# Patient Record
Sex: FEMALE | Race: WHITE | NOT HISPANIC OR LATINO
[De-identification: names, ages, dates, MRNs, and addresses within clinical notes are randomized per-mention and may not be internally consistent; named-entity substitution may affect disease eponyms.]

---

## 2019-08-08 ENCOUNTER — TRANSCRIPTION ENCOUNTER (OUTPATIENT)
Age: 34
End: 2019-08-08

## 2019-08-17 ENCOUNTER — TRANSCRIPTION ENCOUNTER (OUTPATIENT)
Age: 34
End: 2019-08-17

## 2020-04-26 ENCOUNTER — MESSAGE (OUTPATIENT)
Age: 35
End: 2020-04-26

## 2020-05-01 ENCOUNTER — APPOINTMENT (OUTPATIENT)
Dept: DISASTER EMERGENCY | Facility: HOSPITAL | Age: 35
End: 2020-05-01

## 2020-05-04 LAB
SARS-COV-2 IGG SERPL IA-ACNC: 0.2 RATIO
SARS-COV-2 IGG SERPL QL IA: NEGATIVE

## 2020-05-30 ENCOUNTER — TRANSCRIPTION ENCOUNTER (OUTPATIENT)
Age: 35
End: 2020-05-30

## 2020-06-01 PROBLEM — Z00.00 ENCOUNTER FOR PREVENTIVE HEALTH EXAMINATION: Status: ACTIVE | Noted: 2020-06-01

## 2020-06-03 ENCOUNTER — APPOINTMENT (OUTPATIENT)
Dept: ORTHOPEDIC SURGERY | Facility: CLINIC | Age: 35
End: 2020-06-03
Payer: COMMERCIAL

## 2020-06-03 VITALS
TEMPERATURE: 98.8 F | HEART RATE: 71 BPM | DIASTOLIC BLOOD PRESSURE: 68 MMHG | WEIGHT: 155 LBS | SYSTOLIC BLOOD PRESSURE: 104 MMHG | HEIGHT: 61 IN | BODY MASS INDEX: 29.27 KG/M2

## 2020-06-03 DIAGNOSIS — L03.019 CELLULITIS OF UNSPECIFIED FINGER: ICD-10-CM

## 2020-06-03 DIAGNOSIS — Z78.9 OTHER SPECIFIED HEALTH STATUS: ICD-10-CM

## 2020-06-03 PROCEDURE — 10060 I&D ABSCESS SIMPLE/SINGLE: CPT | Mod: F1

## 2020-06-03 PROCEDURE — 99204 OFFICE O/P NEW MOD 45 MIN: CPT | Mod: 25

## 2020-06-16 ENCOUNTER — APPOINTMENT (OUTPATIENT)
Dept: ORTHOPEDIC SURGERY | Facility: CLINIC | Age: 35
End: 2020-06-16

## 2020-07-15 PROBLEM — Z78.9 NO PERTINENT PAST MEDICAL HISTORY: Status: RESOLVED | Noted: 2020-07-15 | Resolved: 2020-07-15

## 2020-07-15 PROBLEM — L03.019 PARONYCHIA OF FINGER, UNSPECIFIED LATERALITY: Status: ACTIVE | Noted: 2020-07-15

## 2020-07-15 NOTE — ASSESSMENT
[FreeTextEntry1] : 34-year-old female with left index finger paronychia status post I&D in the office today. She will continue daily dressing changes and warm soaks, she will continue antibiotics for one week.Followup as needed.

## 2020-07-15 NOTE — HISTORY OF PRESENT ILLNESS
[FreeTextEntry1] : 34-year-old female presents for evaluation of a left index finger paronychia. Her symptoms began on May 22, 2020 she denies specific trauma. She has throbbing pain surrounded the nailbed it has not improved with warm soaks. She had a drainage attemptedo n 30 May which was unsuccessful.

## 2020-07-15 NOTE — PROCEDURE
[FreeTextEntry1] : Left index finger paronychia I&D:\par Digital block administered using 10 cc 1% plain lidocaine\par Digital tourniquet applied\par Longitudinal Incision made at the nail fold, purulent material expressed and swelling was decompressed successfully.\par Bandage applied to the fingertip and the tourniquet was removed, patient tolerated the procedure well.

## 2020-07-15 NOTE — PHYSICAL EXAM
[Normal Finger/nose] : finger to nose coordination [Normal] : no peripheral adenopathy appreciated [de-identified] : anibalr [de-identified] : left index finger:\par Skin intact positive swelling positive erythema no ecchymosis\par no tenderness at the DIP joint active flexion and extension intact\par No swelling or tenderness along the flexor tendon sheath, range of motion of the digits intact\par Neurovascularly intact distally

## 2021-03-25 ENCOUNTER — APPOINTMENT (OUTPATIENT)
Dept: ORTHOPEDIC SURGERY | Facility: CLINIC | Age: 36
End: 2021-03-25
Payer: COMMERCIAL

## 2021-03-25 VITALS
HEART RATE: 77 BPM | HEIGHT: 61 IN | WEIGHT: 155 LBS | BODY MASS INDEX: 29.27 KG/M2 | DIASTOLIC BLOOD PRESSURE: 81 MMHG | TEMPERATURE: 98.2 F | SYSTOLIC BLOOD PRESSURE: 135 MMHG

## 2021-03-25 DIAGNOSIS — M25.562 PAIN IN LEFT KNEE: ICD-10-CM

## 2021-03-25 DIAGNOSIS — M79.642 PAIN IN LEFT HAND: ICD-10-CM

## 2021-03-25 PROCEDURE — 73562 X-RAY EXAM OF KNEE 3: CPT | Mod: LT

## 2021-03-25 PROCEDURE — 73130 X-RAY EXAM OF HAND: CPT | Mod: LT

## 2021-03-25 PROCEDURE — 99072 ADDL SUPL MATRL&STAF TM PHE: CPT

## 2021-03-25 PROCEDURE — 99214 OFFICE O/P EST MOD 30 MIN: CPT

## 2021-03-25 NOTE — HISTORY OF PRESENT ILLNESS
[de-identified] : Patient presents today for evaluation of left knee pain and left hand pain. Her ports a jumping over a fence of a height of approximately 6 feet about a month ago. She reports she's had pain since that time. The pain initially was in the knee and now she develops pain. She reports the left knee pain is posterior. It does cause her to limp. It's worse when she stands for extended periods of time. Many swelling is reported. No specific buckling as reported. No past knee injuries or surgeries reported. The patient is right-hand dominant. She reports of left hand pain in the area of the thenar eminence. She states is only present when she actively moves her hand. She denies any associated bruising. No motor changes. No sensory changes.\par \par Review of Systems-\par Constitutional: No fever or chills. \par Cardiovascular: No orthopnea or chest pain\par Pulmonary: No shortness of breath. \par GI: No nausea or vomiting or abdominal pain.\par Musculoskeletal: see HPI \par Psychiatric: No anxiety and depression.

## 2021-03-25 NOTE — DISCUSSION/SUMMARY
[de-identified] : All of the x-rays reviewed with the patient. There is concern the patient had sustained a medial meniscal tear upon landing after jumping over the fence. She is referred for an MRI. The patient is encouraged to followup in the office after completion of the MRI to review the findings and discuss future treatment plan. As for the left hand, the patient likely suffered a sprain. She will continue to monitor. No indication for bracing at this time. She will reduce her activities until she follows up the left the MRI. All questions were answered to the patient's satisfaction.

## 2021-03-25 NOTE — PHYSICAL EXAM
[de-identified] : The patient appears well nourished and in no apparent distress. The patient is alert and oriented to person, place, and time. Affect and mood appear normal. The head is normocephalic and atraumatic. Skin shows normal turgor with no evidence of eczema or psoriasis. No respiratory distress noted. Sensation grossly intact. MUSCULOSKELETAL:   SEE BELOW\par \par Left knee exam demonstrates normal alignment. No effusions. No erythema or ecchymosis. There is full range of motion 0-125° of flexion. There is localized discomfort of the medial joint line with palpation. There is no tenderness over the medial lateral collateral ligaments at their insertion origins. No laxity of the knee with stress testing. No patellofemoral crepitus.\par \par Left hand chemistries normal alignment. No signs of acute trauma. Normal range of motion actively. No reproducible tenderness of the thenar eminence. No tenderness of the scaphoid. No wrist tenderness. No crepitus. Normal sensation. [de-identified] : Left knee x-rays reviewed. Normal alignment. Normal joint spacing. No fractures.\par \par Left hand x-rays are reviewed. No dislocations. No scaphoid fracture. No retained foreign body. Yes

## 2021-03-30 PROBLEM — M25.562 LEFT KNEE PAIN: Status: ACTIVE | Noted: 2021-03-25

## 2021-03-30 PROBLEM — M79.642 LEFT HAND PAIN: Status: ACTIVE | Noted: 2021-03-25

## 2021-05-10 ENCOUNTER — APPOINTMENT (OUTPATIENT)
Dept: MRI IMAGING | Facility: CLINIC | Age: 36
End: 2021-05-10
Payer: COMMERCIAL

## 2021-05-10 ENCOUNTER — OUTPATIENT (OUTPATIENT)
Dept: OUTPATIENT SERVICES | Facility: HOSPITAL | Age: 36
LOS: 1 days | End: 2021-05-10
Payer: COMMERCIAL

## 2021-05-10 DIAGNOSIS — M25.562 PAIN IN LEFT KNEE: ICD-10-CM

## 2021-05-10 PROCEDURE — 73721 MRI JNT OF LWR EXTRE W/O DYE: CPT

## 2021-05-10 PROCEDURE — 73721 MRI JNT OF LWR EXTRE W/O DYE: CPT | Mod: 26,LT

## 2021-06-01 ENCOUNTER — APPOINTMENT (OUTPATIENT)
Dept: ORTHOPEDIC SURGERY | Facility: CLINIC | Age: 36
End: 2021-06-01
Payer: COMMERCIAL

## 2021-06-01 VITALS
HEART RATE: 63 BPM | SYSTOLIC BLOOD PRESSURE: 135 MMHG | HEIGHT: 61 IN | DIASTOLIC BLOOD PRESSURE: 81 MMHG | WEIGHT: 165 LBS | BODY MASS INDEX: 31.15 KG/M2

## 2021-06-01 PROCEDURE — 99072 ADDL SUPL MATRL&STAF TM PHE: CPT

## 2021-06-01 PROCEDURE — 99213 OFFICE O/P EST LOW 20 MIN: CPT

## 2021-06-07 ENCOUNTER — APPOINTMENT (OUTPATIENT)
Dept: ORTHOPEDIC SURGERY | Facility: CLINIC | Age: 36
End: 2021-06-07
Payer: COMMERCIAL

## 2021-06-07 VITALS — WEIGHT: 168 LBS | BODY MASS INDEX: 31.72 KG/M2 | HEIGHT: 61 IN

## 2021-06-07 PROCEDURE — 99214 OFFICE O/P EST MOD 30 MIN: CPT

## 2021-06-07 PROCEDURE — 99072 ADDL SUPL MATRL&STAF TM PHE: CPT

## 2021-06-07 NOTE — DISCUSSION/SUMMARY
[de-identified] : The underlying pathophysiology was reviewed in great detail with the patient as well as the various treatment options, including ice, analgesics, NSAIDs, Physical therapy, steroid injections, bracing, surgical intervention \par \par MRI of the left knee was reviewed and discussed in great detail today. MRI shows high-grade tear of the femoral origin of the anterior cruciate ligament. Discussed surgical intervention versus conservative management.  The risks and benefits of an arthroscopic ACL construction were reviewed and discussed in great detail. In addition to conservative management of bracing, physical therapy and activity modifications. Based on patients patients age and current activity level, and activity goals, patient elects to proceed with surgical intervention at this time. \par The patient wishes to proceed with SURGICAL INTERVENTION at this time. The risks and benefits of a Arthroscopic ACL reconstruction with a quadriceps tendon autograft were discussed in great detail today, including but not limited to bleeding, infection, nerve injury, DVT, allergy to the anesthetic or to the implants, re-tear, persistent pain, stiffness, scarring, swelling or deformity.\par \par I am prescribing this mechanical DVT Prophylaxis as an alternative/ addition to pharmacological anticoagulants. I consider this form of mechanical prophylaxis to be an equally effective protocol in the post-operative prevention of DVT and PE without excessive bleeding, other potential risk factors and contraindications. I am prescribing this mechanical prophylaxis as in order to help with localized edema and to improve circulation.  This desired mechanical prophylaxis will also benefit the patient if hypertension is present.\par \par I am prescribing the use of NMES (neuromuscular electrical stimulator, Manaflexx 2) to aid in disuse atrophy. The device is to be used for at-home treatment following surgery to help stimulate the surrounding muscles and prevent atrophy that would otherwise occur due to lack of use.\par  \par Surgical Outcomes System (SOS) was discussed at great length today. Patient was educated on the SOS global registry and the benefits of completing patient-reported outcome surveys, including remote monitoring of pain, function, and well-being following their treatment. Patient was encouraged to complete pre- and post-treatment surveys in a timely manner, allowing you to stay current on treatment outcomes. Surveys can be completed on PCs, smart devices, or tablets via any web browser, or they can be completed in the office. Discussed with patient that the SOS software is used to collect data, monitor patients’ treatment progress, and discuss results during follow-up visits. Patient will complete surveys during the preoperative period through 5 years post operatively. Patient consented and signed waiver.\par \par Activity modifications and restrictions were discussed. I advised avoiding deep bending, squatting and high intensity activity.\par \par All questions were answered, all alternatives discussed and the patient is in complete agreement with that plan. Follow-up appointment as instructed. Any issues and the patient will call or come in sooner.

## 2021-06-07 NOTE — PHYSICAL EXAM
[de-identified] : Right Lower Extremity\par o Knee :\par ¦ Inspection/Palpation : no tenderness to palpation, no swelling, no deformity\par ¦ Range of Motion : 0 - 125 degrees, no crepitus\par ¦ Stability : no valgus or varus instability present on provocative testing, Lachman’s Test (-)\par ¦ Strength : hip flexion 5/5, adduction 5/5, gluteus medius 5/5 \par o Muscle Bulk : normal muscle bulk present\par o Skin : no erythema, no ecchymosis\par o Sensation : sensation to pin intact\par o Vascular Exam : no edema, no cyanosis, dorsalis pedis artery pulse 2+, posterior tibial artery pulse 2+\par \par Left Lower Extremity\par o Knee :\par ¦ Inspection/Palpation : no tenderness to palpation, no swelling, no deformity\par ¦ Range of Motion : 0 -125 degrees, no crepitus\par ¦ Stability : no valgus or varus instability present on provocative testing, Lachman’s Test (0-1+)\par ¦ Strength : hip flexion 5/5, adduction 5/5, gluteus medius 5/5 \par o Muscle Bulk : normal muscle bulk present\par o Skin : no erythema, no ecchymosis\par o Sensation : sensation to pin intact\par o Vascular Exam : no edema, no cyanosis, dorsalis pedis artery pulse 2+, posterior tibial artery pulse 2+  [de-identified] : Patient comes to today's visit with outside imaging already performed. I reviewed the images in detail with the patient and discussed the findings as highlighted below. \par \par o MRI of the left knee performed on 05/10/2021 at [facility]: impression: \par 1. High-grade partial-thickness tear of the femoral origin of the anterior cruciate ligament. The remnant fibers appear edematous and amorphus. A thin strand of anterior cruciate ligament fibers may remain intact.\par 2. Mild sprain of the medial collateral ligament.\par 3. Mild osseous contusion along the posterior aspect of the medial tibial plateau. Additional smaller osseous contusions along the central aspect lateral femoral condyle and the posterior aspect lateral tibial plateau.\par 4. Intrasubstance signal within the body/posterior horn and posterior horn of the medial meniscus which does not definitively contact an articular surface to constitute a tear. This may be related to intrasubstance degeneration or meniscal contusion.\par 5. Mild chondral wear along the medial and lateral patellar facets.\par 6. Moderate joint effusion with synovitis.\par 7. Mild muscle strain involving the soleus muscle.\par \par \par o Xray of the left knee performed at [facility] on 03/25/2021: Impression: \par ¦  no acute fracture or dislocation. There is no medial, no lateral and no patellofemoral degenerative changes seen. There is no significant malalignment. No significant other obvious osseous abnormality, otherwise unremarkable. \par

## 2021-06-07 NOTE — HISTORY OF PRESENT ILLNESS
[de-identified] : MARIBEL MARTINO is a 35 year female presenting to the office complaining of left knee. She presents to the office ambulating independently. Patient reports pain began in March 2021.  Patient notes she jumped off a playground to reach her son who got hurt. She felt a pop and her knee buckled at that time and she had immediate pain. She was seen by DAVE Roberts (PCP), MRI was obtained which showed ACL tear. Patient was previously seen by Dr. Mcleod and is here today for a second opinion. Patient describes the pain as a dull aching, and occasionally sharp pain diffusely located to her knee that is intermittent in nature. Her   symptoms are exacerbated with activity and strenuous use of the knee.   Pain is alleviated with rest.  Patient reports instability, but denies catching or locking of the knee.  She has become more active and walks 4-6 weeks a week and cycles on the Peloton. Patient is taking NSAIDs for pain relief with moderate relief in symptoms.  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 Patient denies any other complaints at this time.

## 2021-06-25 NOTE — HISTORY OF PRESENT ILLNESS
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 She jumped off a play ground to reach her son who got hurt. She felt a pop and her knee buckled at that time and she had immediate pain. She was seen by DAVE Roberts, MRI was obtained which showed ACL tear. Pain is brought on with activity and is not taking pain medication. Applies a brace when needed. Reports instability. She has become more active and walks 4-6 weeks a week and cycles on the Peloton. \par \par The patient's past medical history, past surgical history, medications and allergies were reviewed by me today with the patient and documented accordingly. In addition, the patient's family and social history, which were noncontributory to this visit, were reviewed also.

## 2021-06-25 NOTE — ADDENDUM
[FreeTextEntry1] : This note was written by Lissette Bower on 06/01/2021 acting solely as a scribe for Dr. Alberto Nichole.\par \par All medical record entries made by the Scribe were at my, Dr. Alberto Nichole, direction and personally dictated by me on 06/01/2021. I have personally reviewed the chart and agree that the record accurately reflects my personal performance of the history, physical exam, assessment and plan.

## 2021-06-25 NOTE — PHYSICAL EXAM
[de-identified] : Oriented to time, place, person\par Mood: Normal\par Affect: Normal\par Appearance: Healthy, well appearing, no acute distress.\par Gait: Normal\par Assistive Devices: None\par \par Left Knee Exam:\par \par Skin: Clean, dry, intact\par Inspection: No obvious malalignment, no masses, no swelling, no effusion\par Pulses: 2+ DP/PT pulses \par ROM: 0-130 degrees of flexion. No pain with deep knee flexion/extension.\par Tenderness: No MJLT. No LJLT. No pain over the patella facets. No pain to the quadriceps tendon. No pain to the patella tendon. No posterior knee tenderness.\par Stability: Stable to varus, valgus. IIB Lachman testing. + anterior drawer, negative posterior drawer.\par Strength: 5/5 Q/H/TA/GS/EHL, without atrophy\par Neuro: Intact to light touch throughout, DTRs normal\par Additional Tests: Negative Hammad's test, Negative patellar grind test  [de-identified] : Images were reviewed from [facility] dated 03/25/2021.\par \par 4 views of the left knee were obtained that show no acute fracture or dislocation. There is no medial, no lateral and no patellofemoral degenerative changes seen. There is no significant malalignment. No significant other obvious osseous abnormality, otherwise unremarkable. \par \par MRI of the left knee dated 5/8/2021 shows high-grade tear of the femoral origin of the anterior cruciate ligament. The remnant fibers appear edematous and amorphus. Intrasubstance signal within the body/posterior horn and posterior horn of the medial meniscus.

## 2021-06-25 NOTE — DISCUSSION/SUMMARY
[de-identified] : 34 y/o female with left knee ACL tear\par \par Patient presents today for evaluation of left knee injury.  Reveals to be a pivoting injury with high-grade tear of the anterior cruciate ligament, but clinical examination suggest that she does not have any significant instability in the may be some intact fibers are suggested on MRI.  MRI shows high-grade tear of the femoral origin of the anterior cruciate ligament. We discussed conservative management vs. surgical intervention. We discussed conservative management may be appropriate and to see whether there is any continued progression of scarring that would enable her to return to a low impact lifestyle without significant dysfunction. We also discussed surgical intervention for those patients that require return to pivoting activities or have instability that is significant and limits ability to return to daily activities. Patient is leaning towards surgical intervention as she does not want to be limited by her left knee. \par \par All risks, benefits and alternatives to left anterior cruciate ligament reconstruction with evaluation of the meniscal and chondral surfaces were discussed in great detail with the patient. Risks include but are not limited to pain, bleeding, infection, stiffness/instability, impingement, graft re-rupture, meniscectomy versus repair, medical complications and risks of anesthesia. In addition, a discussion was had with the patient regarding anterior cruciate ligament graft options. This included the role of autograft versus allograft, and the associated donor site morbidity and rerupture rate with autograft versus risk of disease transmission/rerupture with allograft use. The patient expressed understanding and all questions were answered.  \par \par A discussion was also had with the patient regarding additional risk given recent Covid-19 pandemic; including the potential additional risk of anesthesia and any medical comorbidities that the patient has.  It was made clear to the patient that we are taking all precautions regarding Covid-19 with regards to surgical scheduling and perioperative care.  The patient understands that current protocol requires Covid-19 testing no more than 48hrs prior to surgery, and PST's may be performed onsite at the day of surgery. \par \par The patient is electing to proceed, and will have the patient will follow-up if she has elected to proceed.

## 2021-07-08 ENCOUNTER — OUTPATIENT (OUTPATIENT)
Dept: OUTPATIENT SERVICES | Facility: HOSPITAL | Age: 36
LOS: 1 days | End: 2021-07-08
Payer: COMMERCIAL

## 2021-07-08 VITALS
RESPIRATION RATE: 12 BRPM | HEART RATE: 73 BPM | HEIGHT: 60.5 IN | WEIGHT: 167.55 LBS | SYSTOLIC BLOOD PRESSURE: 120 MMHG | DIASTOLIC BLOOD PRESSURE: 81 MMHG | TEMPERATURE: 98 F | OXYGEN SATURATION: 98 %

## 2021-07-08 DIAGNOSIS — Z98.890 OTHER SPECIFIED POSTPROCEDURAL STATES: Chronic | ICD-10-CM

## 2021-07-08 DIAGNOSIS — S83.512A SPRAIN OF ANTERIOR CRUCIATE LIGAMENT OF LEFT KNEE, INITIAL ENCOUNTER: ICD-10-CM

## 2021-07-08 DIAGNOSIS — Z98.891 HISTORY OF UTERINE SCAR FROM PREVIOUS SURGERY: Chronic | ICD-10-CM

## 2021-07-08 DIAGNOSIS — Z01.818 ENCOUNTER FOR OTHER PREPROCEDURAL EXAMINATION: ICD-10-CM

## 2021-07-08 LAB
HCT VFR BLD CALC: 41.3 % — SIGNIFICANT CHANGE UP (ref 34.5–45)
HGB BLD-MCNC: 13.3 G/DL — SIGNIFICANT CHANGE UP (ref 11.5–15.5)
MCHC RBC-ENTMCNC: 29.4 PG — SIGNIFICANT CHANGE UP (ref 27–34)
MCHC RBC-ENTMCNC: 32.2 GM/DL — SIGNIFICANT CHANGE UP (ref 32–36)
MCV RBC AUTO: 91.4 FL — SIGNIFICANT CHANGE UP (ref 80–100)
NRBC # BLD: 0 /100 WBCS — SIGNIFICANT CHANGE UP (ref 0–0)
PLATELET # BLD AUTO: 312 K/UL — SIGNIFICANT CHANGE UP (ref 150–400)
RBC # BLD: 4.52 M/UL — SIGNIFICANT CHANGE UP (ref 3.8–5.2)
RBC # FLD: 13.3 % — SIGNIFICANT CHANGE UP (ref 10.3–14.5)
WBC # BLD: 10.33 K/UL — SIGNIFICANT CHANGE UP (ref 3.8–10.5)
WBC # FLD AUTO: 10.33 K/UL — SIGNIFICANT CHANGE UP (ref 3.8–10.5)

## 2021-07-08 PROCEDURE — 85027 COMPLETE CBC AUTOMATED: CPT

## 2021-07-08 PROCEDURE — G0463: CPT

## 2021-07-08 PROCEDURE — 36415 COLL VENOUS BLD VENIPUNCTURE: CPT

## 2021-07-08 NOTE — H&P PST ADULT - HISTORY OF PRESENT ILLNESS
36 yo female presents s/p ACL tear on the left after jumping from the second platform of a playground March 2021. Now reports pain and instability. Pain 2/10 at rest and increased to 4-5/10 with activity. Pain is relieved with stretching exercises. 34 yo female presents s/p ACL tear on the left after jumping from the second platform of a playground March 2021. Now reports pain and instability. Pain 2/10 at rest and increased to 4-5/10 with activity. Underwent a few sessions of PT after initial injury with no change in symptoms. Pain is relieved with stretching exercises.

## 2021-07-08 NOTE — H&P PST ADULT - MUSCULOSKELETAL
details… detailed exam left knee/ROM intact/no joint swelling/no joint erythema/no joint warmth/no calf tenderness/diminished strength

## 2021-07-08 NOTE — H&P PST ADULT - NSICDXFAMILYHX_GEN_ALL_CORE_FT
FAMILY HISTORY:  Father  Still living? No  Family history of colon cancer, Age at diagnosis: Age Unknown    Mother  Still living? Yes, Estimated age: 61-70  Family history of irritable bowel syndrome, Age at diagnosis: Age Unknown  Family history of osteoporosis, Age at diagnosis: Age Unknown

## 2021-07-08 NOTE — H&P PST ADULT - NSICDXPASTSURGICALHX_GEN_ALL_CORE_FT
PAST SURGICAL HISTORY:  H/O  section  and     H/O exploratory laparotomy  for ectopic rupture    History of dilation and curettage with left salpingectomy      PAST SURGICAL HISTORY:  H/O  section  and     H/O exploratory laparotomy  for ectopic rupture with right salpingectomy    History of dilation and curettage with left salpingectomy

## 2021-07-08 NOTE — H&P PST ADULT - NSICDXPROBLEM_GEN_ALL_CORE_FT
PROBLEM DIAGNOSES  Problem: Left ACL tear  Assessment and Plan: left knee arthroscopic ACL reconstruction with quadriceps autograft. instructed to stop MVI today. surgical wash instructions reviewed and verbalized understanding

## 2021-07-08 NOTE — H&P PST ADULT - NSICDXPASTMEDICALHX_GEN_ALL_CORE_FT
PAST MEDICAL HISTORY:  COVID-19 vaccine series completed     Left ACL tear March 2021     PAST MEDICAL HISTORY:  COVID-19 vaccine series completed moderna, completed 1/25/21    Left ACL tear March 2021    Obesity (BMI 30.0-34.9)

## 2021-07-13 ENCOUNTER — TRANSCRIPTION ENCOUNTER (OUTPATIENT)
Age: 36
End: 2021-07-13

## 2021-07-14 ENCOUNTER — RESULT REVIEW (OUTPATIENT)
Age: 36
End: 2021-07-14

## 2021-07-14 ENCOUNTER — OUTPATIENT (OUTPATIENT)
Dept: OUTPATIENT SERVICES | Facility: HOSPITAL | Age: 36
LOS: 1 days | End: 2021-07-14
Payer: COMMERCIAL

## 2021-07-14 ENCOUNTER — APPOINTMENT (OUTPATIENT)
Dept: ORTHOPEDIC SURGERY | Facility: HOSPITAL | Age: 36
End: 2021-07-14

## 2021-07-14 VITALS
RESPIRATION RATE: 10 BRPM | TEMPERATURE: 99 F | HEIGHT: 61 IN | OXYGEN SATURATION: 97 % | SYSTOLIC BLOOD PRESSURE: 129 MMHG | DIASTOLIC BLOOD PRESSURE: 68 MMHG | WEIGHT: 165.57 LBS | HEART RATE: 62 BPM

## 2021-07-14 VITALS
HEART RATE: 58 BPM | SYSTOLIC BLOOD PRESSURE: 110 MMHG | OXYGEN SATURATION: 100 % | RESPIRATION RATE: 14 BRPM | DIASTOLIC BLOOD PRESSURE: 69 MMHG

## 2021-07-14 DIAGNOSIS — Z01.818 ENCOUNTER FOR OTHER PREPROCEDURAL EXAMINATION: ICD-10-CM

## 2021-07-14 DIAGNOSIS — S83.512A SPRAIN OF ANTERIOR CRUCIATE LIGAMENT OF LEFT KNEE, INITIAL ENCOUNTER: ICD-10-CM

## 2021-07-14 DIAGNOSIS — Z98.890 OTHER SPECIFIED POSTPROCEDURAL STATES: Chronic | ICD-10-CM

## 2021-07-14 DIAGNOSIS — Z98.891 HISTORY OF UTERINE SCAR FROM PREVIOUS SURGERY: Chronic | ICD-10-CM

## 2021-07-14 PROCEDURE — 29888 ARTHRS AID ACL RPR/AGMNTJ: CPT | Mod: LT

## 2021-07-14 PROCEDURE — C1713: CPT

## 2021-07-14 PROCEDURE — 88304 TISSUE EXAM BY PATHOLOGIST: CPT

## 2021-07-14 PROCEDURE — 97161 PT EVAL LOW COMPLEX 20 MIN: CPT

## 2021-07-14 PROCEDURE — 88304 TISSUE EXAM BY PATHOLOGIST: CPT | Mod: 26

## 2021-07-14 RX ORDER — APREPITANT 80 MG/1
40 CAPSULE ORAL ONCE
Refills: 0 | Status: COMPLETED | OUTPATIENT
Start: 2021-07-14 | End: 2021-07-14

## 2021-07-14 RX ORDER — CEFAZOLIN SODIUM 1 G
2000 VIAL (EA) INJECTION ONCE
Refills: 0 | Status: COMPLETED | OUTPATIENT
Start: 2021-07-14 | End: 2021-07-14

## 2021-07-14 RX ORDER — ONDANSETRON 8 MG/1
4 TABLET, FILM COATED ORAL ONCE
Refills: 0 | Status: DISCONTINUED | OUTPATIENT
Start: 2021-07-14 | End: 2021-07-15

## 2021-07-14 RX ORDER — HYDROMORPHONE HYDROCHLORIDE 2 MG/ML
0.5 INJECTION INTRAMUSCULAR; INTRAVENOUS; SUBCUTANEOUS
Refills: 0 | Status: DISCONTINUED | OUTPATIENT
Start: 2021-07-14 | End: 2021-07-15

## 2021-07-14 RX ORDER — OXYCODONE AND ACETAMINOPHEN 5; 325 MG/1; MG/1
1 TABLET ORAL
Qty: 30 | Refills: 0
Start: 2021-07-14

## 2021-07-14 RX ORDER — SODIUM CHLORIDE 9 MG/ML
1000 INJECTION, SOLUTION INTRAVENOUS
Refills: 0 | Status: DISCONTINUED | OUTPATIENT
Start: 2021-07-14 | End: 2021-07-15

## 2021-07-14 RX ORDER — CHLORHEXIDINE GLUCONATE 213 G/1000ML
1 SOLUTION TOPICAL ONCE
Refills: 0 | Status: COMPLETED | OUTPATIENT
Start: 2021-07-14 | End: 2021-07-14

## 2021-07-14 RX ORDER — OXYCODONE HYDROCHLORIDE 5 MG/1
5 TABLET ORAL ONCE
Refills: 0 | Status: DISCONTINUED | OUTPATIENT
Start: 2021-07-14 | End: 2021-07-14

## 2021-07-14 RX ORDER — ASPIRIN/CALCIUM CARB/MAGNESIUM 324 MG
1 TABLET ORAL
Qty: 30 | Refills: 0
Start: 2021-07-14 | End: 2021-08-12

## 2021-07-14 RX ADMIN — SODIUM CHLORIDE 75 MILLILITER(S): 9 INJECTION, SOLUTION INTRAVENOUS at 17:00

## 2021-07-14 RX ADMIN — APREPITANT 40 MILLIGRAM(S): 80 CAPSULE ORAL at 11:30

## 2021-07-14 RX ADMIN — HYDROMORPHONE HYDROCHLORIDE 0.5 MILLIGRAM(S): 2 INJECTION INTRAMUSCULAR; INTRAVENOUS; SUBCUTANEOUS at 17:10

## 2021-07-14 RX ADMIN — CHLORHEXIDINE GLUCONATE 1 APPLICATION(S): 213 SOLUTION TOPICAL at 11:32

## 2021-07-14 RX ADMIN — HYDROMORPHONE HYDROCHLORIDE 0.5 MILLIGRAM(S): 2 INJECTION INTRAMUSCULAR; INTRAVENOUS; SUBCUTANEOUS at 16:58

## 2021-07-14 RX ADMIN — OXYCODONE HYDROCHLORIDE 5 MILLIGRAM(S): 5 TABLET ORAL at 17:32

## 2021-07-14 RX ADMIN — OXYCODONE HYDROCHLORIDE 5 MILLIGRAM(S): 5 TABLET ORAL at 18:55

## 2021-07-14 NOTE — ASU DISCHARGE PLAN (ADULT/PEDIATRIC) - ASU DC SPECIAL INSTRUCTIONSFT
Follow up in the office in 7-10 days for suture removal   do not remove steri strips  may remove large dressing after 48 hours  can cover sutures with band aids   may shower after 48 hours  remain in jania brace at all times locked in extension.   may be weightbearing as tolerated. Follow up in the office in 7-10 days for suture removal   do not remove steri strips  may remove large dressing after 48 hours  can cover sutures with band aids   may shower after 48 hours  remain in jania brace at all times locked in extension.   may be weightbearing as tolerated. in jania brace at all times  narcotic pain medications as needed

## 2021-07-14 NOTE — ASU DISCHARGE PLAN (ADULT/PEDIATRIC) - CARE PROVIDER_API CALL
Jalen Steiner)  Orthopaedic Sports Medicine; Orthopaedic Surgery  [address]  [place] [address]  Phone: [phone]  Fax: [phone]  Established Patient  Follow Up Time:

## 2021-07-14 NOTE — ASU PATIENT PROFILE, ADULT - PMH
COVID-19 vaccine series completed  moderna, completed 1/25/21  Left ACL tear  March 2021  Obesity (BMI 30.0-34.9)

## 2021-07-14 NOTE — ASU PATIENT PROFILE, ADULT - PSH
H/O  section   and   H/O exploratory laparotomy   for ectopic rupture with right salpingectomy  History of dilation and curettage  with left salpingectomy

## 2021-07-15 PROBLEM — E66.9 OBESITY, UNSPECIFIED: Chronic | Status: ACTIVE | Noted: 2021-07-08

## 2021-07-15 PROBLEM — S83.512A SPRAIN OF ANTERIOR CRUCIATE LIGAMENT OF LEFT KNEE, INITIAL ENCOUNTER: Chronic | Status: ACTIVE | Noted: 2021-07-08

## 2021-07-15 PROBLEM — Z92.29 PERSONAL HISTORY OF OTHER DRUG THERAPY: Chronic | Status: ACTIVE | Noted: 2021-07-08

## 2021-07-23 ENCOUNTER — APPOINTMENT (OUTPATIENT)
Dept: ORTHOPEDIC SURGERY | Facility: CLINIC | Age: 36
End: 2021-07-23
Payer: COMMERCIAL

## 2021-07-23 PROCEDURE — 73560 X-RAY EXAM OF KNEE 1 OR 2: CPT | Mod: LT

## 2021-07-23 PROCEDURE — 99024 POSTOP FOLLOW-UP VISIT: CPT

## 2021-07-25 NOTE — HISTORY OF PRESENT ILLNESS
[Doing Well] : is doing well [Adequate Pain Control] : has adequate pain control [de-identified] : s/p Left knee arthroscopy with anterior cruciate ligament reconstruction on 07/14/2021.  [de-identified] : 35 year female presents for a post operative evaluation s/p Left knee arthroscopy with anterior cruciate ligament reconstruction on 07/14/2021. Patient states she is feeling good post operatively. Patient presents in a a jania brace ambulating with crutches. She has been utilizing the NICE intermittent compression and cryotherapy with good relief in symptoms. \par Patient denies fever, chills, nausea or vomiting. Patient is here today for wound check, suture removal, xrays and clinical evaluation  [de-identified] : Left Lower Extremity\par o Knee :\par ¦ Inspection/Palpation : diffuse tenderness to palpation, diffuse swelling, no deformity, incisions clean, dry and intact, nonerythematous, no discharge or dehiscence. \par ¦ Range of Motion : 0 -60 degrees, no crepitus\par ¦ Stability : not assessed\par ¦ Strength : hip flexion- not assessed today due to post operative status. \par o Muscle Bulk : normal muscle bulk present\par o Skin : no erythema, no ecchymosis\par o Sensation : sensation to pin intact\par o Vascular Exam : no edema, no cyanosis, dorsalis pedis artery pulse 2+, posterior tibial artery pulse 2+ [de-identified] : o LEFT Knee : AP, and lateral views of the knee were obtained, there are no soft tissue abnormalities, no fractures, alignment is normal, normal appearing joint spaces, normal bone density, no bony lesions.s/p ACL reconstruction with bone tunnel and tight rope buttons in appropriate position [de-identified] : Arthroscopy images reviewed in great detail with patient.\par \par Sutures were removed and Steri-Strips were placed. He was instructed to allow the Steri-Strips to fall off on their own.\par \par Patient is to begin physical therapy. A prescription for Physical Therapy was provided. A protocol was provided today. \par \par She can discontinue use of the crutches.\par \par She can begin to wean out of use of the knee brace once he regains strength and control of his quadriceps.\par \par Activity modifications and restrictions were discussed. She is to walk in a straight line and avoid twisting and pivoting movements.\par \par \par FU 4 weeks. \par \par All questions were answered, all alternatives discussed and the patient is in complete agreement with that plan. Follow-up appointment as instructed. Any issues and the patient will call or come in sooner.

## 2021-08-31 ENCOUNTER — APPOINTMENT (OUTPATIENT)
Dept: ORTHOPEDIC SURGERY | Facility: CLINIC | Age: 36
End: 2021-08-31
Payer: COMMERCIAL

## 2021-08-31 VITALS — WEIGHT: 167 LBS | HEIGHT: 61 IN | BODY MASS INDEX: 31.53 KG/M2

## 2021-08-31 PROCEDURE — 99024 POSTOP FOLLOW-UP VISIT: CPT

## 2021-08-31 NOTE — HISTORY OF PRESENT ILLNESS
[Doing Well] : is doing well [Adequate Pain Control] : has adequate pain control [de-identified] : s/p Left knee arthroscopy with anterior cruciate ligament reconstruction on 07/14/2021.  [de-identified] : 35 year female presents for a post operative evaluation s/p Left knee arthroscopy with anterior cruciate ligament reconstruction on 07/14/2021. Patient states she is feeling good post operatively. She has been doing physical therapy and is here for a follow up visit. [de-identified] : Left Lower Extremity\par o Knee :\par ¦ Inspection/Palpation : no tenderness to palpation, minimal swelling, no deformity, incisions healing well\par ¦ Range of Motion : 0 -130 degrees, no crepitus\par ¦ Stability : no varus or valgus instability, Lachman's (-)\par ¦ Strength : hip flexion- 5/5\par o Muscle Bulk : normal muscle bulk present\par o Skin : no erythema, no ecchymosis\par o Sensation : sensation to pin intact\par o Vascular Exam : no edema, no cyanosis, dorsalis pedis artery pulse 2+, posterior tibial artery pulse 2+ [de-identified] : \par Patient is to continue physical therapy. \par \par \par \par \par Activity modifications and restrictions were discussed. She is to avoid twisting and pivoting movements.\par \par \par FU 6 weeks. \par \par All questions were answered, all alternatives discussed and the patient is in complete agreement with that plan. Follow-up appointment as instructed. Any issues and the patient will call or come in sooner.

## 2021-10-12 ENCOUNTER — APPOINTMENT (OUTPATIENT)
Dept: ORTHOPEDIC SURGERY | Facility: CLINIC | Age: 36
End: 2021-10-12
Payer: COMMERCIAL

## 2021-10-12 VITALS — HEIGHT: 61 IN | BODY MASS INDEX: 30.4 KG/M2 | WEIGHT: 161 LBS

## 2021-10-12 PROCEDURE — 99024 POSTOP FOLLOW-UP VISIT: CPT

## 2021-10-12 NOTE — HISTORY OF PRESENT ILLNESS
[Doing Well] : is doing well [Adequate Pain Control] : has adequate pain control [de-identified] : s/p Left knee arthroscopy with anterior cruciate ligament reconstruction on 07/14/2021.  [de-identified] : 35 year female presents for a post operative evaluation s/p Left knee arthroscopy with anterior cruciate ligament reconstruction on 07/14/2021. Patient states she is feeling good post operatively. She has been doing physical therapy and is here for a follow up visit. Patient notes medial aspect pain, specifically with weighted knee flexion. They have been progressing her PT, plyometrics and single leg activities with no complaints.  [de-identified] : Left Lower Extremity\par o Knee :\par ¦ Inspection/Palpation : medial joint line tenderness to palpation, minimal swelling, no deformity, incisions healing well\par ¦ Range of Motion : 0 -130 degrees, no crepitus\par ¦ Stability : no varus or valgus instability, Lachman's (-)\par ¦ Strength : hip flexion: 5/5, adduction 5/5 \par o Muscle Bulk : normal muscle bulk present\par o Skin : no erythema, no ecchymosis\par o Sensation : sensation to pin intact\par o Vascular Exam : no edema, no cyanosis, dorsalis pedis artery pulse 2+, posterior tibial artery pulse 2+ [de-identified] : \par Patient is to continue physical therapy. \par \par \par \par \par Activity modifications and restrictions were discussed. She is to avoid twisting and pivoting movements.\par \par \par FU 6 weeks. \par \par All questions were answered, all alternatives discussed and the patient is in complete agreement with that plan. Follow-up appointment as instructed. Any issues and the patient will call or come in sooner.

## 2022-01-18 ENCOUNTER — APPOINTMENT (OUTPATIENT)
Dept: ORTHOPEDIC SURGERY | Facility: CLINIC | Age: 37
End: 2022-01-18
Payer: COMMERCIAL

## 2022-01-18 VITALS — WEIGHT: 166 LBS | BODY MASS INDEX: 31.34 KG/M2 | HEIGHT: 61 IN

## 2022-01-18 DIAGNOSIS — S83.512D SPRAIN OF ANTERIOR CRUCIATE LIGAMENT OF LEFT KNEE, SUBSEQUENT ENCOUNTER: ICD-10-CM

## 2022-01-18 PROCEDURE — 99213 OFFICE O/P EST LOW 20 MIN: CPT

## 2022-01-18 NOTE — REASON FOR VISIT
[Follow-Up Visit] : a follow-up visit for [Aftercare Following Surgery] : aftercare following surgery [FreeTextEntry2] : s/p Left knee arthroscopy with anterior cruciate ligament reconstruction on 07/14/2021.

## 2022-01-18 NOTE — DISCUSSION/SUMMARY
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 \par \par Continue home exercise program. A new home exercise sheet was given and discussed with the patient to follow. \par \par Activity modifications and restrictions were discussed. She is to avoid twisting and pivoting movements.\par \par FU 6 weeks. \par \par All questions were answered, all alternatives discussed and the patient is in complete agreement with that plan. Follow-up appointment as instructed. Any issues and the patient will call or come in sooner.

## 2022-01-18 NOTE — HISTORY OF PRESENT ILLNESS
[de-identified] : 36 year female presents for a post operative evaluation s/p Left knee arthroscopy with anterior cruciate ligament reconstruction on 07/14/2021. Patient states she is feeling good post operatively. She has since discontinued physical therapy and continued with a home exercise program. she notes feeling better overall. She notes some soreness after activity. \par

## 2022-01-18 NOTE — PHYSICAL EXAM
[de-identified] : Left Lower Extremity\par o Knee :\par ¦ Inspection/Palpation : minimal medial joint line tenderness to palpation, no swelling, no deformity, incisions healing well\par ¦ Range of Motion : 0 -130 degrees, no crepitus\par ¦ Stability : no varus or valgus instability, Lachman's (-)\par ¦ Strength : hip flexion: 5/5, adduction 5/5 \par o Muscle Bulk : mild residual quadriceps atrophy\par o Skin : no erythema, no ecchymosis\par o Sensation : sensation to pin intact\par o Vascular Exam : no edema, no cyanosis, dorsalis pedis artery pulse 2+, posterior tibial artery pulse 2+.

## 2022-05-24 NOTE — H&P PST ADULT - [id number]_GEN_A_CORE
No. SANDY screening performed.  STOP BANG Legend: 0-2 = LOW Risk; 3-4 = INTERMEDIATE Risk; 5-8 = HIGH Risk Epidermal Sutures: 6-0 Polypropylene

## 2022-06-26 ENCOUNTER — NON-APPOINTMENT (OUTPATIENT)
Age: 37
End: 2022-06-26

## 2024-02-10 ENCOUNTER — NON-APPOINTMENT (OUTPATIENT)
Age: 39
End: 2024-02-10

## 2024-02-29 ENCOUNTER — APPOINTMENT (OUTPATIENT)
Dept: ORTHOPEDIC SURGERY | Facility: CLINIC | Age: 39
End: 2024-02-29

## 2025-03-07 ENCOUNTER — APPOINTMENT (OUTPATIENT)
Dept: MAMMOGRAPHY | Facility: CLINIC | Age: 40
End: 2025-03-07
Payer: COMMERCIAL

## 2025-03-07 ENCOUNTER — OUTPATIENT (OUTPATIENT)
Dept: OUTPATIENT SERVICES | Facility: HOSPITAL | Age: 40
LOS: 1 days | End: 2025-03-07
Payer: COMMERCIAL

## 2025-03-07 DIAGNOSIS — Z98.890 OTHER SPECIFIED POSTPROCEDURAL STATES: Chronic | ICD-10-CM

## 2025-03-07 DIAGNOSIS — Z00.8 ENCOUNTER FOR OTHER GENERAL EXAMINATION: ICD-10-CM

## 2025-03-07 DIAGNOSIS — Z98.891 HISTORY OF UTERINE SCAR FROM PREVIOUS SURGERY: Chronic | ICD-10-CM

## 2025-03-07 PROCEDURE — 77067 SCR MAMMO BI INCL CAD: CPT

## 2025-03-07 PROCEDURE — 77063 BREAST TOMOSYNTHESIS BI: CPT

## 2025-03-07 PROCEDURE — 77063 BREAST TOMOSYNTHESIS BI: CPT | Mod: 26

## 2025-03-07 PROCEDURE — 77067 SCR MAMMO BI INCL CAD: CPT | Mod: 26

## 2025-03-17 ENCOUNTER — APPOINTMENT (OUTPATIENT)
Dept: ULTRASOUND IMAGING | Facility: CLINIC | Age: 40
End: 2025-03-17
Payer: COMMERCIAL

## 2025-03-17 ENCOUNTER — OUTPATIENT (OUTPATIENT)
Dept: OUTPATIENT SERVICES | Facility: HOSPITAL | Age: 40
LOS: 1 days | End: 2025-03-17
Payer: COMMERCIAL

## 2025-03-17 ENCOUNTER — APPOINTMENT (OUTPATIENT)
Dept: MAMMOGRAPHY | Facility: CLINIC | Age: 40
End: 2025-03-17
Payer: COMMERCIAL

## 2025-03-17 DIAGNOSIS — Z98.890 OTHER SPECIFIED POSTPROCEDURAL STATES: Chronic | ICD-10-CM

## 2025-03-17 DIAGNOSIS — Z98.891 HISTORY OF UTERINE SCAR FROM PREVIOUS SURGERY: Chronic | ICD-10-CM

## 2025-03-17 DIAGNOSIS — R92.8 OTHER ABNORMAL AND INCONCLUSIVE FINDINGS ON DIAGNOSTIC IMAGING OF BREAST: ICD-10-CM

## 2025-03-17 DIAGNOSIS — Z00.8 ENCOUNTER FOR OTHER GENERAL EXAMINATION: ICD-10-CM

## 2025-03-17 PROCEDURE — G0279: CPT

## 2025-03-17 PROCEDURE — G0279: CPT | Mod: 26

## 2025-03-17 PROCEDURE — 77065 DX MAMMO INCL CAD UNI: CPT

## 2025-03-17 PROCEDURE — 77065 DX MAMMO INCL CAD UNI: CPT | Mod: 26,LT

## 2025-03-17 PROCEDURE — 76642 ULTRASOUND BREAST LIMITED: CPT | Mod: 26,LT

## 2025-03-17 PROCEDURE — 76642 ULTRASOUND BREAST LIMITED: CPT

## 2025-04-02 ENCOUNTER — RESULT REVIEW (OUTPATIENT)
Age: 40
End: 2025-04-02

## 2025-04-02 ENCOUNTER — APPOINTMENT (OUTPATIENT)
Dept: ULTRASOUND IMAGING | Facility: CLINIC | Age: 40
End: 2025-04-02
Payer: COMMERCIAL

## 2025-04-02 ENCOUNTER — OUTPATIENT (OUTPATIENT)
Dept: OUTPATIENT SERVICES | Facility: HOSPITAL | Age: 40
LOS: 1 days | End: 2025-04-02
Payer: COMMERCIAL

## 2025-04-02 DIAGNOSIS — N63.0 UNSPECIFIED LUMP IN UNSPECIFIED BREAST: ICD-10-CM

## 2025-04-02 DIAGNOSIS — Z98.891 HISTORY OF UTERINE SCAR FROM PREVIOUS SURGERY: Chronic | ICD-10-CM

## 2025-04-02 DIAGNOSIS — Z98.890 OTHER SPECIFIED POSTPROCEDURAL STATES: Chronic | ICD-10-CM

## 2025-04-02 PROCEDURE — 19083 BX BREAST 1ST LESION US IMAG: CPT

## 2025-04-02 PROCEDURE — 19083 BX BREAST 1ST LESION US IMAG: CPT | Mod: LT

## 2025-04-02 PROCEDURE — 77065 DX MAMMO INCL CAD UNI: CPT

## 2025-04-02 PROCEDURE — 88305 TISSUE EXAM BY PATHOLOGIST: CPT

## 2025-04-02 PROCEDURE — 88305 TISSUE EXAM BY PATHOLOGIST: CPT | Mod: 26

## 2025-04-02 PROCEDURE — A4648: CPT

## 2025-04-02 PROCEDURE — 77065 DX MAMMO INCL CAD UNI: CPT | Mod: 26,LT

## 2025-04-07 LAB — SURGICAL PATHOLOGY STUDY: SIGNIFICANT CHANGE UP
